# Patient Record
Sex: MALE | Race: WHITE
[De-identification: names, ages, dates, MRNs, and addresses within clinical notes are randomized per-mention and may not be internally consistent; named-entity substitution may affect disease eponyms.]

---

## 2022-08-03 ENCOUNTER — APPOINTMENT (OUTPATIENT)
Dept: ORTHOPEDIC SURGERY | Facility: CLINIC | Age: 32
End: 2022-08-03

## 2022-08-03 VITALS — WEIGHT: 165 LBS | HEIGHT: 72 IN | BODY MASS INDEX: 22.35 KG/M2

## 2022-08-03 DIAGNOSIS — Z78.9 OTHER SPECIFIED HEALTH STATUS: ICD-10-CM

## 2022-08-03 DIAGNOSIS — S39.012A STRAIN OF MUSCLE, FASCIA AND TENDON OF LOWER BACK, INITIAL ENCOUNTER: ICD-10-CM

## 2022-08-03 PROBLEM — Z00.00 ENCOUNTER FOR PREVENTIVE HEALTH EXAMINATION: Status: ACTIVE | Noted: 2022-08-03

## 2022-08-03 PROCEDURE — 99203 OFFICE O/P NEW LOW 30 MIN: CPT

## 2022-08-03 RX ORDER — METHOCARBAMOL 750 MG/1
750 TABLET, FILM COATED ORAL
Qty: 30 | Refills: 1 | Status: ACTIVE | COMMUNITY
Start: 2022-08-03 | End: 1900-01-01

## 2022-08-03 RX ORDER — MELOXICAM 15 MG/1
15 TABLET ORAL DAILY
Qty: 30 | Refills: 0 | Status: ACTIVE | COMMUNITY
Start: 2022-08-03 | End: 1900-01-01

## 2022-08-04 PROBLEM — Z78.9 SOCIAL ALCOHOL USE: Status: ACTIVE | Noted: 2022-08-03

## 2022-08-04 NOTE — DISCUSSION/SUMMARY
[Medication Risks Reviewed] : Medication risks reviewed [de-identified] : Discussed pathology of symptoms associated with myofascial strain. Advised patient to use hot/cold compresses, as well as OTC NSAIDs as needed. I am prescribing the patient methocarbamol. Titration schedule provided. Patient was provided with a referral for lumbar physical therapy to work on stretching, strengthening and range of motion. Patient was provided with a lumbar home exercise program. I discussed with the patient that if his pain does not improve we can consider getting a lumbar spine MRI at his next visit. Will follow up in 5-6 weeks.\par \par SCARLETT CASTILLO Acting as a Scribe for Dr. Caba\Scarlett Jaramillo, attest that this documentation has been prepared under the direction and in the presence of Provider Jonathon Caba MD. \par \par \par \par \par \par

## 2022-08-04 NOTE — HISTORY OF PRESENT ILLNESS
[de-identified] : 33 y/o male presents for an initial eval of back pain. Denies hx back pain. Reports exacerbation of low back pain after work approx several weeks ago. Reports back muscle spasms/tightness, but this has improved since the incident. Denies pain radiating into the BLE, but states he has hip pain. General health is good.

## 2022-08-04 NOTE — PHYSICAL EXAM
[Flexion] : flexion [Extension] : extension [de-identified] : Constitutional:\par - General Appearance:\par Unremarkable\par Body Habitus\par Well Developed\par Well Nourished\par Body Habitus\par No Deformities\par Well Groomed\par Ability To communicate:\par Normal\par Neurologic:\par Global sensation is intact to upper and lower extremities. See examination of Neck and/or Spine\par for exceptions.\par Orientation to Time, Place and Person is: Normal\par Mood And Affect is Normal\par Skin:\par - Head/Face, Right Upper/Lower Extremity, Left Upper/Lower Extremity: Normal\par See Examination of Neck and/or Spine for exceptions\par Cardiovascular:\par Peripheral Cardiovascular System is Normal\par Palpation of Lymph Nodes:\par Normal Palpation of lymph nodes in: Axilla, Cervical, Inguinal\par Abnormal Palpation of lymph nodes in: None  [] : full ROM with pain

## 2022-09-14 ENCOUNTER — APPOINTMENT (OUTPATIENT)
Dept: ORTHOPEDIC SURGERY | Facility: CLINIC | Age: 32
End: 2022-09-14

## 2024-03-13 ENCOUNTER — EMERGENCY (EMERGENCY)
Facility: HOSPITAL | Age: 34
LOS: 0 days | Discharge: HOME | End: 2024-03-14
Attending: STUDENT IN AN ORGANIZED HEALTH CARE EDUCATION/TRAINING PROGRAM
Payer: COMMERCIAL

## 2024-03-13 VITALS
HEART RATE: 120 BPM | TEMPERATURE: 99 F | WEIGHT: 149.91 LBS | OXYGEN SATURATION: 97 % | SYSTOLIC BLOOD PRESSURE: 150 MMHG | RESPIRATION RATE: 18 BRPM | DIASTOLIC BLOOD PRESSURE: 97 MMHG

## 2024-03-13 DIAGNOSIS — F10.129 ALCOHOL ABUSE WITH INTOXICATION, UNSPECIFIED: ICD-10-CM

## 2024-03-13 DIAGNOSIS — F43.24 ADJUSTMENT DISORDER WITH DISTURBANCE OF CONDUCT: ICD-10-CM

## 2024-03-13 LAB
ALBUMIN SERPL ELPH-MCNC: 5 G/DL — SIGNIFICANT CHANGE UP (ref 3.3–5)
ALP SERPL-CCNC: 55 U/L — SIGNIFICANT CHANGE UP (ref 40–120)
ALT FLD-CCNC: 31 U/L — SIGNIFICANT CHANGE UP (ref 12–78)
AMPHET UR-MCNC: NEGATIVE — SIGNIFICANT CHANGE UP
ANION GAP SERPL CALC-SCNC: 5 MMOL/L — SIGNIFICANT CHANGE UP (ref 5–17)
APAP SERPL-MCNC: < 2 UG/ML (ref 10–30)
APPEARANCE UR: CLEAR — SIGNIFICANT CHANGE UP
AST SERPL-CCNC: 18 U/L — SIGNIFICANT CHANGE UP (ref 15–37)
BARBITURATES UR SCN-MCNC: NEGATIVE — SIGNIFICANT CHANGE UP
BASOPHILS # BLD AUTO: 0.04 K/UL — SIGNIFICANT CHANGE UP (ref 0–0.2)
BASOPHILS NFR BLD AUTO: 0.5 % — SIGNIFICANT CHANGE UP (ref 0–2)
BENZODIAZ UR-MCNC: NEGATIVE — SIGNIFICANT CHANGE UP
BILIRUB SERPL-MCNC: 0.5 MG/DL — SIGNIFICANT CHANGE UP (ref 0.2–1.2)
BILIRUB UR-MCNC: NEGATIVE — SIGNIFICANT CHANGE UP
BUN SERPL-MCNC: 11 MG/DL — SIGNIFICANT CHANGE UP (ref 7–23)
CALCIUM SERPL-MCNC: 10.1 MG/DL — SIGNIFICANT CHANGE UP (ref 8.5–10.1)
CHLORIDE SERPL-SCNC: 106 MMOL/L — SIGNIFICANT CHANGE UP (ref 96–108)
CO2 SERPL-SCNC: 28 MMOL/L — SIGNIFICANT CHANGE UP (ref 22–31)
COCAINE METAB.OTHER UR-MCNC: NEGATIVE — SIGNIFICANT CHANGE UP
COLOR SPEC: YELLOW — SIGNIFICANT CHANGE UP
CREAT SERPL-MCNC: 1.31 MG/DL — HIGH (ref 0.5–1.3)
DIFF PNL FLD: NEGATIVE — SIGNIFICANT CHANGE UP
EGFR: 73 ML/MIN/1.73M2 — SIGNIFICANT CHANGE UP
EOSINOPHIL # BLD AUTO: 0.01 K/UL — SIGNIFICANT CHANGE UP (ref 0–0.5)
EOSINOPHIL NFR BLD AUTO: 0.1 % — SIGNIFICANT CHANGE UP (ref 0–6)
ETHANOL SERPL-MCNC: 139 MG/DL — HIGH (ref 0–10)
GLUCOSE SERPL-MCNC: 117 MG/DL — HIGH (ref 70–99)
GLUCOSE UR QL: NEGATIVE MG/DL — SIGNIFICANT CHANGE UP
HCT VFR BLD CALC: 42.1 % — SIGNIFICANT CHANGE UP (ref 39–50)
HGB BLD-MCNC: 15.1 G/DL — SIGNIFICANT CHANGE UP (ref 13–17)
IMM GRANULOCYTES NFR BLD AUTO: 0.4 % — SIGNIFICANT CHANGE UP (ref 0–0.9)
KETONES UR-MCNC: NEGATIVE MG/DL — SIGNIFICANT CHANGE UP
LEUKOCYTE ESTERASE UR-ACNC: NEGATIVE — SIGNIFICANT CHANGE UP
LYMPHOCYTES # BLD AUTO: 1.18 K/UL — SIGNIFICANT CHANGE UP (ref 1–3.3)
LYMPHOCYTES # BLD AUTO: 14.5 % — SIGNIFICANT CHANGE UP (ref 13–44)
MCHC RBC-ENTMCNC: 30.9 PG — SIGNIFICANT CHANGE UP (ref 27–34)
MCHC RBC-ENTMCNC: 35.9 GM/DL — SIGNIFICANT CHANGE UP (ref 32–36)
MCV RBC AUTO: 86.1 FL — SIGNIFICANT CHANGE UP (ref 80–100)
METHADONE UR-MCNC: NEGATIVE — SIGNIFICANT CHANGE UP
MONOCYTES # BLD AUTO: 0.55 K/UL — SIGNIFICANT CHANGE UP (ref 0–0.9)
MONOCYTES NFR BLD AUTO: 6.8 % — SIGNIFICANT CHANGE UP (ref 2–14)
NEUTROPHILS # BLD AUTO: 6.32 K/UL — SIGNIFICANT CHANGE UP (ref 1.8–7.4)
NEUTROPHILS NFR BLD AUTO: 77.7 % — HIGH (ref 43–77)
NITRITE UR-MCNC: NEGATIVE — SIGNIFICANT CHANGE UP
OPIATES UR-MCNC: NEGATIVE — SIGNIFICANT CHANGE UP
PCP SPEC-MCNC: SIGNIFICANT CHANGE UP
PCP UR-MCNC: NEGATIVE — SIGNIFICANT CHANGE UP
PH UR: 6 — SIGNIFICANT CHANGE UP (ref 5–8)
PLATELET # BLD AUTO: 326 K/UL — SIGNIFICANT CHANGE UP (ref 150–400)
POTASSIUM SERPL-MCNC: 4.6 MMOL/L — SIGNIFICANT CHANGE UP (ref 3.5–5.3)
POTASSIUM SERPL-SCNC: 4.6 MMOL/L — SIGNIFICANT CHANGE UP (ref 3.5–5.3)
PROT SERPL-MCNC: 8.2 GM/DL — SIGNIFICANT CHANGE UP (ref 6–8.3)
PROT UR-MCNC: NEGATIVE MG/DL — SIGNIFICANT CHANGE UP
RBC # BLD: 4.89 M/UL — SIGNIFICANT CHANGE UP (ref 4.2–5.8)
RBC # FLD: 11.8 % — SIGNIFICANT CHANGE UP (ref 10.3–14.5)
SALICYLATES SERPL-MCNC: 2 MG/DL — LOW (ref 2.8–20)
SODIUM SERPL-SCNC: 139 MMOL/L — SIGNIFICANT CHANGE UP (ref 135–145)
SP GR SPEC: <1.005 — LOW (ref 1–1.03)
THC UR QL: POSITIVE — SIGNIFICANT CHANGE UP
UROBILINOGEN FLD QL: 0.2 MG/DL — SIGNIFICANT CHANGE UP (ref 0.2–1)
WBC # BLD: 8.13 K/UL — SIGNIFICANT CHANGE UP (ref 3.8–10.5)
WBC # FLD AUTO: 8.13 K/UL — SIGNIFICANT CHANGE UP (ref 3.8–10.5)

## 2024-03-13 PROCEDURE — 93005 ELECTROCARDIOGRAM TRACING: CPT

## 2024-03-13 PROCEDURE — 81003 URINALYSIS AUTO W/O SCOPE: CPT

## 2024-03-13 PROCEDURE — 85025 COMPLETE CBC W/AUTO DIFF WBC: CPT

## 2024-03-13 PROCEDURE — 80053 COMPREHEN METABOLIC PANEL: CPT

## 2024-03-13 PROCEDURE — 90792 PSYCH DIAG EVAL W/MED SRVCS: CPT | Mod: 95

## 2024-03-13 PROCEDURE — 99285 EMERGENCY DEPT VISIT HI MDM: CPT

## 2024-03-13 PROCEDURE — 93010 ELECTROCARDIOGRAM REPORT: CPT

## 2024-03-13 PROCEDURE — 80307 DRUG TEST PRSMV CHEM ANLYZR: CPT

## 2024-03-13 PROCEDURE — 36415 COLL VENOUS BLD VENIPUNCTURE: CPT

## 2024-03-13 RX ORDER — NICOTINE POLACRILEX 2 MG
1 GUM BUCCAL ONCE
Refills: 0 | Status: COMPLETED | OUTPATIENT
Start: 2024-03-13 | End: 2024-03-13

## 2024-03-13 RX ADMIN — Medication 1 PATCH: at 23:16

## 2024-03-13 NOTE — ED ADULT NURSE NOTE - CHIEF COMPLAINT QUOTE
[id number] c/o suicidal statements. pt was reported missing by wife since 03/12 at 0600, was found today by police intoxicated at a bar in [place]. per Police, pt had a disagreement with wife over brother in law living at their house, and sent multiple texts expressing suicidal statements, one saying he would kill self with a shotgun, and one saying he would hang himself in the tree in front of their house. pt denies SI/HI in triage. when asked about  psych hx, pt states "I saw a psychiatrist, but they can't help me because I'm perfect." NKDA, denies medical hx.

## 2024-03-13 NOTE — ED BEHAVIORAL HEALTH ASSESSMENT NOTE - HPI (INCLUDE ILLNESS QUALITY, SEVERITY, DURATION, TIMING, CONTEXT, MODIFYING FACTORS, ASSOCIATED SIGNS AND SYMPTOMS)
1 pair
35 yo male, , employed as , living with wife and her brother.   No PPH, no PMH.  BIB PD as wife and her SW determined text content today was concerning for possible SI.    Pt gives history of having a "terrible roommate" right now (wife states it's her brother and agrees they do not get along).   States he is noisy, has opposite sleep cycle to him and is messy.  they are currently getting him to move out.    States today he was fed up and was texting wife his unhappiness re situation "multiple, a lot of, texts".  Pt somewhat guarded re content but says "yes I prob said things that were worrisome".   Per wife this has happened before where he vents via texts while she is at work leads to them arguing.   Pt texted "lots of things" but did say he's so fed up he wants to get a gun and shoot himself.  Denies access to a gun and states he would never really do this.   Very future oriented during interview.   Wife agrees she d/n think he was actually going to kill himself.    BAL in ER was 140.   Pt admits to going out to have "a beer and smoke cigarettes" after texting and then wife had called 911, phone ping was detected and police came, handcuffed him to come to ER.  Was not agitated during this process.       Wife in ER, saw pt, agrees he is at baseline.   Feels additional problem is pt has not eaten much in last 2 days and has had weight loss with stress of brother in law in house.  She agrees he has no PPH and likely this is problems adjusting to stressor at home.    Pt denies S/HI, does not want voluntary admit.

## 2024-03-13 NOTE — ED BEHAVIORAL HEALTH ASSESSMENT NOTE - DESCRIPTION
as above, lives with wife, stressed re "roommate" who is in process of moving out see BTCM note denies

## 2024-03-13 NOTE — ED BEHAVIORAL HEALTH ASSESSMENT NOTE - SUMMARY
33 yo male, , employed as , living with wife and her brother.   No PPH, no PMH.  BIB PD as wife and her SW determined text content today was concerning for possible SI.    Pt gives history of having a "terrible roommate" right now (wife states it's her brother and agrees they do not get along).   States he is noisy, has opposite sleep cycle to him and is messy.  they are currently getting him to move out.    States today he was fed up and was texting wife his unhappiness re situation "multiple, a lot of, texts".  Pt somewhat guarded re content but says "yes I prob said things that were worrisome".   Per wife this has happened before where he vents via texts while she is at work leads to them arguing.   Pt texted "lots of things" but did say he's so fed up he wants to get a gun and shoot himself.  Denies access to a gun and states he would never really do this.   Very future oriented during interview.   Wife agrees she d/n think he was actually going to kill himself.    BAL in ER was 140.   Pt admits to going out to have "a beer and smoke cigarettes" after texting and then wife had called 911, phone ping was detected and police came, handcuffed him to come to ER.  Was not agitated during this process.     Wife supports pt is stable and not an acute danger to self/others.   Pt does not want voluntary admit.

## 2024-03-13 NOTE — ED ADULT TRIAGE NOTE - CHIEF COMPLAINT QUOTE
[id number] c/o suicidal statements. pt was reported missing by wife since 03/12 at 0600, was found today by police intoxicated at a bar in [place]. per Police, pt had a disagreement with wife over brother in law living at their house, and sent multiple texts expressing suicidal statements, one saying he would kill self with a shotgun, and one saying he would hang himself in the tree in front of their house. pt denies SI/HI in triage. when asked about  psych hx, pt states "I saw a psychiatrist, but they can't help me because I'm perfect." NKDA, denies medical hx.

## 2024-03-13 NOTE — ED ADULT NURSE NOTE - OBJECTIVE STATEMENT
[id number] c/o suicidal statements. pt was reported missing by wife since 03/12 at 0600, was found today by police intoxicated at a bar in [place]. per Police, pt had a disagreement with wife over brother in law living at their house, and sent multiple texts expressing suicidal statements, one saying he would kill self with a shotgun, and one saying he would hang himself in the tree in front of their house. pt denies SI/HI in triage. when asked about  psych hx, pt states "I saw a psychiatrist, but they can't help me because I'm perfect." NKDA, denies medical hx, clothes removed and surrendered to the security, on gown, environment kept safe, on 1:1 care, alert and oriented, denies drug use, safety maintained and applied.

## 2024-03-14 VITALS
RESPIRATION RATE: 20 BRPM | HEART RATE: 68 BPM | OXYGEN SATURATION: 97 % | TEMPERATURE: 98 F | DIASTOLIC BLOOD PRESSURE: 96 MMHG | SYSTOLIC BLOOD PRESSURE: 125 MMHG

## 2024-03-14 DIAGNOSIS — F43.24 ADJUSTMENT DISORDER WITH DISTURBANCE OF CONDUCT: ICD-10-CM

## 2024-03-14 NOTE — ED PROVIDER NOTE - CLINICAL SUMMARY MEDICAL DECISION MAKING FREE TEXT BOX
0004: Sera GRULLON: Signout received Dr. Blancas pending psychiatry evaluation.  Spoke to psychiatry attending from telepsychiatry.  Patient  is cleared from a psychiatric perspective.  Will be discharged home with wife who is at bedside.  Patient and wife both comfortable with plan.  Will discharge home.

## 2024-03-14 NOTE — ED PROVIDER NOTE - PATIENT PORTAL LINK FT
You can access the FollowMyHealth Patient Portal offered by [facility] by registering at the following website: [email or web address]. By joining [facility]’s FollowMyHealth portal, you will also be able to view your health information using other applications (apps) compatible with our system.

## 2024-03-14 NOTE — ED PROVIDER NOTE - OBJECTIVE STATEMENT
[id number] c/o suicidal statements. pt was reported missing by wife since 03/12 at 0600, was found today by police intoxicated at a bar in [place]. per Police, pt had a disagreement with wife over brother in law living at their house, and sent multiple texts expressing suicidal statements, one saying he would kill self with a shotgun, and one saying he would hang himself in the tree in front of their house. pt denies SI/HI in triage. when asked about  psych hx, pt states "I saw a psychiatrist, but they can't help me because I'm perfect."

## 2024-03-16 ENCOUNTER — EMERGENCY (EMERGENCY)
Facility: HOSPITAL | Age: 34
LOS: 1 days | Discharge: HOME | End: 2024-03-16
Attending: STUDENT IN AN ORGANIZED HEALTH CARE EDUCATION/TRAINING PROGRAM
Payer: COMMERCIAL

## 2024-03-16 VITALS
HEART RATE: 88 BPM | SYSTOLIC BLOOD PRESSURE: 122 MMHG | TEMPERATURE: 98 F | OXYGEN SATURATION: 99 % | RESPIRATION RATE: 17 BRPM | DIASTOLIC BLOOD PRESSURE: 80 MMHG | WEIGHT: 150.36 LBS

## 2024-03-16 DIAGNOSIS — F33.1 MAJOR DEPRESSIVE DISORDER, RECURRENT, MODERATE: ICD-10-CM

## 2024-03-16 LAB
ALBUMIN SERPL ELPH-MCNC: 4.5 G/DL — SIGNIFICANT CHANGE UP (ref 3.3–5.2)
ALP SERPL-CCNC: 47 U/L — SIGNIFICANT CHANGE UP (ref 40–120)
ALT FLD-CCNC: 18 U/L — SIGNIFICANT CHANGE UP
ANION GAP SERPL CALC-SCNC: 13 MMOL/L — SIGNIFICANT CHANGE UP (ref 5–17)
APAP SERPL-MCNC: <3 UG/ML — LOW (ref 10–26)
AST SERPL-CCNC: 20 U/L — SIGNIFICANT CHANGE UP
BASOPHILS # BLD AUTO: 0.03 K/UL — SIGNIFICANT CHANGE UP (ref 0–0.2)
BASOPHILS NFR BLD AUTO: 0.5 % — SIGNIFICANT CHANGE UP (ref 0–2)
BILIRUB SERPL-MCNC: 0.3 MG/DL — LOW (ref 0.4–2)
BUN SERPL-MCNC: 20.8 MG/DL — HIGH (ref 8–20)
CALCIUM SERPL-MCNC: 9.1 MG/DL — SIGNIFICANT CHANGE UP (ref 8.4–10.5)
CHLORIDE SERPL-SCNC: 104 MMOL/L — SIGNIFICANT CHANGE UP (ref 96–108)
CO2 SERPL-SCNC: 24 MMOL/L — SIGNIFICANT CHANGE UP (ref 22–29)
CREAT SERPL-MCNC: 0.91 MG/DL — SIGNIFICANT CHANGE UP (ref 0.5–1.3)
EGFR: 113 ML/MIN/1.73M2 — SIGNIFICANT CHANGE UP
EOSINOPHIL # BLD AUTO: 0.03 K/UL — SIGNIFICANT CHANGE UP (ref 0–0.5)
EOSINOPHIL NFR BLD AUTO: 0.5 % — SIGNIFICANT CHANGE UP (ref 0–6)
ETHANOL SERPL-MCNC: <10 MG/DL — SIGNIFICANT CHANGE UP (ref 0–9)
GLUCOSE SERPL-MCNC: 109 MG/DL — HIGH (ref 70–99)
HCT VFR BLD CALC: 37.3 % — LOW (ref 39–50)
HGB BLD-MCNC: 13.2 G/DL — SIGNIFICANT CHANGE UP (ref 13–17)
IMM GRANULOCYTES NFR BLD AUTO: 0.2 % — SIGNIFICANT CHANGE UP (ref 0–0.9)
LYMPHOCYTES # BLD AUTO: 1.37 K/UL — SIGNIFICANT CHANGE UP (ref 1–3.3)
LYMPHOCYTES # BLD AUTO: 23.9 % — SIGNIFICANT CHANGE UP (ref 13–44)
MAGNESIUM SERPL-MCNC: 2.1 MG/DL — SIGNIFICANT CHANGE UP (ref 1.6–2.6)
MCHC RBC-ENTMCNC: 30.8 PG — SIGNIFICANT CHANGE UP (ref 27–34)
MCHC RBC-ENTMCNC: 35.4 GM/DL — SIGNIFICANT CHANGE UP (ref 32–36)
MCV RBC AUTO: 87.1 FL — SIGNIFICANT CHANGE UP (ref 80–100)
MONOCYTES # BLD AUTO: 0.48 K/UL — SIGNIFICANT CHANGE UP (ref 0–0.9)
MONOCYTES NFR BLD AUTO: 8.4 % — SIGNIFICANT CHANGE UP (ref 2–14)
NEUTROPHILS # BLD AUTO: 3.82 K/UL — SIGNIFICANT CHANGE UP (ref 1.8–7.4)
NEUTROPHILS NFR BLD AUTO: 66.5 % — SIGNIFICANT CHANGE UP (ref 43–77)
PHOSPHATE SERPL-MCNC: 3.2 MG/DL — SIGNIFICANT CHANGE UP (ref 2.4–4.7)
PLATELET # BLD AUTO: 258 K/UL — SIGNIFICANT CHANGE UP (ref 150–400)
POTASSIUM SERPL-MCNC: 4 MMOL/L — SIGNIFICANT CHANGE UP (ref 3.5–5.3)
POTASSIUM SERPL-SCNC: 4 MMOL/L — SIGNIFICANT CHANGE UP (ref 3.5–5.3)
PROT SERPL-MCNC: 6.7 G/DL — SIGNIFICANT CHANGE UP (ref 6.6–8.7)
RBC # BLD: 4.28 M/UL — SIGNIFICANT CHANGE UP (ref 4.2–5.8)
RBC # FLD: 12.3 % — SIGNIFICANT CHANGE UP (ref 10.3–14.5)
SALICYLATES SERPL-MCNC: <0.6 MG/DL — LOW (ref 10–20)
SODIUM SERPL-SCNC: 141 MMOL/L — SIGNIFICANT CHANGE UP (ref 135–145)
TSH SERPL-MCNC: 1.3 UIU/ML — SIGNIFICANT CHANGE UP (ref 0.27–4.2)
WBC # BLD: 5.74 K/UL — SIGNIFICANT CHANGE UP (ref 3.8–10.5)
WBC # FLD AUTO: 5.74 K/UL — SIGNIFICANT CHANGE UP (ref 3.8–10.5)

## 2024-03-16 PROCEDURE — 85025 COMPLETE CBC W/AUTO DIFF WBC: CPT

## 2024-03-16 PROCEDURE — G0378: CPT

## 2024-03-16 PROCEDURE — 80053 COMPREHEN METABOLIC PANEL: CPT

## 2024-03-16 PROCEDURE — 84100 ASSAY OF PHOSPHORUS: CPT

## 2024-03-16 PROCEDURE — 84443 ASSAY THYROID STIM HORMONE: CPT

## 2024-03-16 PROCEDURE — 36415 COLL VENOUS BLD VENIPUNCTURE: CPT

## 2024-03-16 PROCEDURE — 93005 ELECTROCARDIOGRAM TRACING: CPT

## 2024-03-16 PROCEDURE — 80307 DRUG TEST PRSMV CHEM ANLYZR: CPT

## 2024-03-16 PROCEDURE — 83735 ASSAY OF MAGNESIUM: CPT

## 2024-03-16 PROCEDURE — 93010 ELECTROCARDIOGRAM REPORT: CPT

## 2024-03-16 PROCEDURE — 90792 PSYCH DIAG EVAL W/MED SRVCS: CPT

## 2024-03-16 PROCEDURE — 99223 1ST HOSP IP/OBS HIGH 75: CPT

## 2024-03-16 PROCEDURE — 99283 EMERGENCY DEPT VISIT LOW MDM: CPT | Mod: 25

## 2024-03-16 RX ORDER — FLUOXETINE HCL 10 MG
1 CAPSULE ORAL
Qty: 30 | Refills: 0
Start: 2024-03-16 | End: 2024-04-14

## 2024-03-16 NOTE — ED BEHAVIORAL HEALTH ASSESSMENT NOTE - SUMMARY
Patient a 35 y/o  male, domiciled with wife, no children, past psychiatric hx of Depression, prior drug abuse hx, denied active drugs, no medical issues was BIB/Self to ED for Psychiatric Evaluation for meds.    Patient is AAOX3, endorses that for past 3 months he is sad for reasons unknown, has fair to good sleep, fair appetite and lost significant weight. He endorses that he was on Methadone treatment in the past, and during that period he was sad and depressed and was given Prozac which seemed to help him during that time. He added that he is no longer on Methadone, does  not take drugs, occasionally smokes cannabis and has fair appetite. He feels sad at times and now believes that he may re-start the Prozac which helped in the past. Discussed the option of Prozac and meds s/e. patient is aware including his wife and to start Prozac 10 mg daily starting today. he denied prior or current S/H/I/P, denied A/V/H or Paranoid beliefs. No medical issues at this time.    Plan: Discharge pt home          Prozac 10 mg daily--30 days supply

## 2024-03-16 NOTE — ED BEHAVIORAL HEALTH ASSESSMENT NOTE - HPI (INCLUDE ILLNESS QUALITY, SEVERITY, DURATION, TIMING, CONTEXT, MODIFYING FACTORS, ASSOCIATED SIGNS AND SYMPTOMS)
Patient a 35 y/o  male, domiciled with wife, no children, past psychiatric hx of Depression, prior drug abuse hx, denied active drugs, no medical issues was BIB/Self to ED for Psychiatric Evaluation for meds.    Patient is AAOX3, endorses that for past 3 months he is sad for reasons unknown, has fair to good sleep, fair appetite and lost significant weight. He endorses that he was on Methadone treatment in the past, and during that period he was sad and depressed and was given Prozac which seemed to help him during that time. He added that he is no longer on Methadone, does  not take drugs, occasionally smokes cannabis and has fair appetite. He feels sad at times and now believes that he may re-start the Prozac which helped in the past. Discussed the option of Prozac and meds s/e. patient is aware including his wife and to start Prozac 10 mg daily starting today. he denied prior or current S/H/I/P, denied A/V/H or Paranoid beliefs. No medical issues at this time.

## 2024-03-16 NOTE — ED BEHAVIORAL HEALTH ASSESSMENT NOTE - LEGAL HISTORY
None
Vital Signs Reviewed  GEN: NAD, Comfortable, Awake and Alert, Developmentally Appropriate  HEENT: NCAT, Oropharynx Clear, Clear Sclera, PERRLA, MMM, No LAD, Neck Supple  RESP: CTAB, Nml WOB, No rales/rhonchi/wheezing  CV: RRR, S1S2, No murmurs  ABD: No TTP, ND, No masses, No CVA Tenderness  Extrem/Skin: Mild maculopapular rash on upper back, No other rashes/no mucosal membrane involvement, No rash on palms/soles, Equal pulses bilat, No cyanosis/edema  Neuro: Moving all extremities, No obvious focal deficits

## 2024-03-16 NOTE — ED ADULT TRIAGE NOTE - CHIEF COMPLAINT QUOTE
"im depressed and having mood swings and need to talk to someone". states he was on a  methadone/SSRI regimen previously and taken off "think I need prozac again". does not have a regular therapist. denies HI/SI

## 2024-03-16 NOTE — CHART NOTE - NSCHARTNOTEFT_GEN_A_CORE
Plan is for T&R, rec is for [facility] appt. SW met w/ pt and spouse at bedside, in agreement, signed off HIPAAs and secured [facility] appt for 3/26 at 2:30pm. Pt confirmed having means of getting home by spouse. Treatment team notified. No further SW needs at this time.

## 2024-03-16 NOTE — ED CDU PROVIDER DISPOSITION NOTE - NSFOLLOWUPINSTRUCTIONS_ED_ALL_ED_FT
If you are feeling suicidal, homicidal, depressed, feel that you are hallucinating, or need to talk to someone about your mental health, CALL [facility] -[phone]     You were seen and evaluated the emergency department for your symptoms.  Please take the prescribed Prozac as discussed.  Please follow-up with your psychiatrist and your scheduled appointment.  You have been provided additional resources for psychiatric care.    Please return to the emergency department for any new or concerning symptoms including but not limited to fever, chills, depression, thoughts of hurting yourself or others, chest pain, difficulty breathing.

## 2024-03-16 NOTE — ED STATDOCS - CLINICAL SUMMARY MEDICAL DECISION MAKING FREE TEXT BOX
HPI: 34-year-old male past medical history of opiate abuse with abstinence after detox, depression presents for feeling excessively sad and irritable on the last several weeks.  Patient states he was on Prozac which helped his symptoms while going through rehabilitation however he stopped it because he thought it was related to his withdrawal symptoms at that time.  Patient states now he would like to speak with a psychiatrist and consider resuming the recommended medication for depression.  Patient denies SI/HI.  Denies any drug or alcohol use.  Denies recent illness.  No other current complaints at this time.    ROS:   General: No fever, no chills, no malaise, no fatigue  ENT: No earache, no coryza, no sore throat  Neck: No stiffness, no swollen glands, no dysphagia  Respiratory: No cough, no dyspnea, no pleuritic chest pain  Cardiac: no chest pain, no palpitations, no edema, no jvd  Abdomen: No abdominal pain, no nausea, no vomiting, no diarrhea  : No dysuria, no increase frequency, no urgency, No discharge  Musculoskeletal: No myalgia, no arthralgia  Neurologic: No headache, no vertigo, no paresthesia, no focal deficits  Skin: No rash, no evidence of trauma  All other ROS are negative    PE:  General: A&O x3   Head: NC/AT  Eyes: PERRL, EOMI  ENT: Airway patent, mmm  Pulmonary: CTA b/l, symmetric breath sounds. No W/R/R.  Cardiac: s1s2, RRR, no M,G,R, No JVD  Abdomen: +BS, ND, NT, soft, no guarding, no rebound, no masses , no rigidity  : No CVA TTP, no suprapubic TTP  Back: Normal  spine  Extremities: FROM, symmetric pulses, capillary refill < 2 seconds, no edema, 5/5 strength in b/l UE and LE  Skin: no rash or bruising  Neurologic: alert, speech clear, no focal deficits  Psych: Depressed mood/affect, nl insight.    MDM: 34-year-old male past medical history of opiate abuse with abstinence after detox, depression presents for feeling excessively sad and irritable on the last several weeks.  Case discussed with psychiatry, pending evaluation.  Will obtain baseline clearance labs.  As per independent chart review patient had blood work performed 3 days ago showing no evidence of active infection and normal metabolic profile, no evidence of anemia, negative UA, and blood alcohol level of 139 at that time.  Patient not clinically intoxicated and denies any substance abuse today.  Independent review of EKG shows NSR without ST/T wave changes, 76 bpm, , QRS 90, QTc 418.  Disposition pending psychiatric evaluation and recommendations.

## 2024-03-16 NOTE — ED CDU PROVIDER DISPOSITION NOTE - CLINICAL COURSE
34-year-old male past medical history of opiate abuse with abstinence after detox, depression presents for feeling excessively sad and irritable on the last several weeks.  Patient states he was on Prozac which helped his symptoms while going through rehabilitation however he stopped it because he thought it was related to his withdrawal symptoms at that time.  Patient evaluated by Dr. Jasso, discussed plan with myself.  Prozac restarted and patient to follow-up with outpatient psychiatric services.